# Patient Record
Sex: FEMALE | Race: AMERICAN INDIAN OR ALASKA NATIVE
[De-identification: names, ages, dates, MRNs, and addresses within clinical notes are randomized per-mention and may not be internally consistent; named-entity substitution may affect disease eponyms.]

---
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
## 2022-09-07 ENCOUNTER — HOSPITAL ENCOUNTER (EMERGENCY)
Dept: HOSPITAL 5 - ED | Age: 47
LOS: 1 days | Discharge: LEFT BEFORE DISCHARGE | End: 2022-09-08
Payer: MEDICAID

## 2022-09-07 VITALS — SYSTOLIC BLOOD PRESSURE: 129 MMHG | DIASTOLIC BLOOD PRESSURE: 81 MMHG

## 2022-09-07 DIAGNOSIS — R42: Primary | ICD-10-CM

## 2022-09-07 DIAGNOSIS — Z53.21: ICD-10-CM

## 2022-09-07 DIAGNOSIS — R20.2: ICD-10-CM

## 2022-09-07 LAB
ALBUMIN SERPL-MCNC: 4.2 G/DL (ref 3.9–5)
ALT SERPL-CCNC: 12 UNITS/L (ref 7–56)
BUN SERPL-MCNC: 6 MG/DL (ref 7–17)
BUN/CREAT SERPL: 9 %
CALCIUM SERPL-MCNC: 9 MG/DL (ref 8.4–10.2)
HCT VFR BLD CALC: 33.3 % (ref 30.3–42.9)
HEMOLYSIS INDEX: 1
HGB BLD-MCNC: 10.2 GM/DL (ref 10.1–14.3)
MCHC RBC AUTO-ENTMCNC: 31 % (ref 30–34)
MCV RBC AUTO: 71 FL (ref 79–97)
PLATELET # BLD: 266 K/MM3 (ref 140–440)
RBC # BLD AUTO: 4.67 M/MM3 (ref 3.65–5.03)

## 2022-09-07 PROCEDURE — 85027 COMPLETE CBC AUTOMATED: CPT

## 2022-09-07 PROCEDURE — 80053 COMPREHEN METABOLIC PANEL: CPT

## 2022-09-07 PROCEDURE — 36415 COLL VENOUS BLD VENIPUNCTURE: CPT

## 2022-09-07 PROCEDURE — 84703 CHORIONIC GONADOTROPIN ASSAY: CPT

## 2022-09-07 PROCEDURE — 93005 ELECTROCARDIOGRAM TRACING: CPT

## 2022-09-07 PROCEDURE — 84484 ASSAY OF TROPONIN QUANT: CPT

## 2022-09-08 NOTE — ELECTROCARDIOGRAPH REPORT
[facility]

                                       

Test Date:    2022               Test Time:    16:42:11

Pat Name:     JULIO ARREDONDO             Department:   

Patient ID:   [id number]          Room:          

Gender:       F                        Technician:   KATE

:          1975               Requested By: ELO LAWRENCE

Order Number: [id number]             Reading MD:   Jamari Epps

                                 Measurements

Intervals                              Axis          

Rate:         115                      P:            46

VT:           143                      QRS:          -47

QRSD:         88                       T:            64

QT:           313                                    

QTc:          432                                    

                           Interpretive Statements

Sinus tachycardia

Probable left atrial enlargement

Left anterior fascicular block

Probable anteroseptal infarct, old



No previous ECG available for comparison

Electronically Signed On 2022 10:35:07 EDT by Jamari Epps